# Patient Record
Sex: MALE | Race: WHITE | Employment: FULL TIME | URBAN - METROPOLITAN AREA
[De-identification: names, ages, dates, MRNs, and addresses within clinical notes are randomized per-mention and may not be internally consistent; named-entity substitution may affect disease eponyms.]

---

## 2018-07-30 ENCOUNTER — APPOINTMENT (OUTPATIENT)
Dept: CT IMAGING | Age: 53
End: 2018-07-30
Payer: COMMERCIAL

## 2018-07-30 ENCOUNTER — APPOINTMENT (OUTPATIENT)
Dept: GENERAL RADIOLOGY | Age: 53
End: 2018-07-30
Payer: COMMERCIAL

## 2018-07-30 ENCOUNTER — HOSPITAL ENCOUNTER (EMERGENCY)
Age: 53
Discharge: HOME | End: 2018-07-30
Attending: EMERGENCY MEDICINE
Payer: COMMERCIAL

## 2018-07-30 VITALS
WEIGHT: 254 LBS | HEART RATE: 75 BPM | DIASTOLIC BLOOD PRESSURE: 85 MMHG | SYSTOLIC BLOOD PRESSURE: 146 MMHG | BODY MASS INDEX: 30.93 KG/M2 | OXYGEN SATURATION: 95 % | RESPIRATION RATE: 18 BRPM | HEIGHT: 76 IN

## 2018-07-30 DIAGNOSIS — S06.0X0A CONCUSSION WITHOUT LOSS OF CONSCIOUSNESS, INITIAL ENCOUNTER: Primary | ICD-10-CM

## 2018-07-30 DIAGNOSIS — M54.2 NECK PAIN: ICD-10-CM

## 2018-07-30 DIAGNOSIS — R51.9 INTRACTABLE HEADACHE, UNSPECIFIED CHRONICITY PATTERN, UNSPECIFIED HEADACHE TYPE: ICD-10-CM

## 2018-07-30 DIAGNOSIS — S09.90XA INJURY OF HEAD, INITIAL ENCOUNTER: ICD-10-CM

## 2018-07-30 PROCEDURE — 72125 CT NECK SPINE W/O DYE: CPT

## 2018-07-30 PROCEDURE — 70450 CT HEAD/BRAIN W/O DYE: CPT

## 2018-07-30 PROCEDURE — 99284 EMERGENCY DEPT VISIT MOD MDM: CPT

## 2018-07-30 PROCEDURE — 6370000000 HC RX 637 (ALT 250 FOR IP): Performed by: STUDENT IN AN ORGANIZED HEALTH CARE EDUCATION/TRAINING PROGRAM

## 2018-07-30 RX ORDER — OXYCODONE HYDROCHLORIDE AND ACETAMINOPHEN 5; 325 MG/1; MG/1
1 TABLET ORAL ONCE
Status: COMPLETED | OUTPATIENT
Start: 2018-07-30 | End: 2018-07-30

## 2018-07-30 RX ORDER — OXYCODONE HYDROCHLORIDE AND ACETAMINOPHEN 5; 325 MG/1; MG/1
1 TABLET ORAL EVERY 6 HOURS PRN
Qty: 12 TABLET | Refills: 0 | Status: SHIPPED | OUTPATIENT
Start: 2018-07-30 | End: 2018-08-02

## 2018-07-30 RX ORDER — CYCLOBENZAPRINE HCL 10 MG
10 TABLET ORAL 3 TIMES DAILY PRN
Qty: 10 TABLET | Refills: 0 | Status: SHIPPED | OUTPATIENT
Start: 2018-07-30 | End: 2018-08-09

## 2018-07-30 RX ADMIN — OXYCODONE HYDROCHLORIDE AND ACETAMINOPHEN 1 TABLET: 5; 325 TABLET ORAL at 22:05

## 2018-07-30 ASSESSMENT — ENCOUNTER SYMPTOMS
WHEEZING: 0
EYE REDNESS: 0
VOMITING: 0
SINUS PRESSURE: 0
BACK PAIN: 0
NAUSEA: 0
SHORTNESS OF BREATH: 0
DIARRHEA: 0
SORE THROAT: 0
VISUAL CHANGE: 1
EYE DISCHARGE: 0
BOWEL INCONTINENCE: 0
ABDOMINAL PAIN: 1
COUGH: 0
EYE PAIN: 0

## 2018-07-30 ASSESSMENT — PAIN SCALES - GENERAL
PAINLEVEL_OUTOF10: 7
PAINLEVEL_OUTOF10: 7

## 2018-07-30 ASSESSMENT — PAIN DESCRIPTION - LOCATION: LOCATION: NECK

## 2018-07-30 ASSESSMENT — PAIN DESCRIPTION - PAIN TYPE: TYPE: ACUTE PAIN

## 2018-07-30 ASSESSMENT — PAIN DESCRIPTION - DESCRIPTORS: DESCRIPTORS: THROBBING

## 2018-07-31 NOTE — ED PROVIDER NOTES
allergies. -------------------------------------------------- RESULTS -------------------------------------------------  Labs:  No results found for this visit on 07/30/18. Radiology:  Ct Head Wo Contrast    Result Date: 7/30/2018  EXAM:   CT Head Without Intravenous Contrast EXAM DATE/TIME:   7/30/2018 9:00 PM CLINICAL HISTORY:   46years old, male; Injury or trauma; Fall; Initial encounter; Additional info: Fall, head injury on plavix TECHNIQUE:   Axial computed tomography images of the head/brain without intravenous contrast.  All CT scans at this facility use at least one of these dose optimization techniques: automated exposure control; mA and/or kV adjustment per patient size (includes targeted exams where dose is matched to clinical indication); or iterative reconstruction. COMPARISON:   No relevant prior studies available. FINDINGS:   Brain:  Prominent cisterna magna. Scattered low density areas in the periventricular white matter and basal ganglia probably reflect chronic small vessel ischemic changes. Vascular calcifications are present. There is no intracranial hemorrhage or mass. No abnormal extra-axial or parenchymal fluid collections. Posterior fossa is unremarkable. Ventricles: The ventricles and basilar cisterns are prominant likely related to chronic volume loss. Bones/joints:  See below. Soft tissues:  within normal limits   Sinuses:  Mild mucosal thickening in the ethmoid sinuses present. Mastoid air cells:  Unremarkable as visualized. No mastoid effusion. Nasal cavity/septum:  Minimal fragmentation of the nasal spine of the maxilla, chronicity indeterminate correlate with history. No acute findings. Chronic changes as above.  This report has been electronically signed by Michelle Chu MD.    Ct Cervical Spine Wo Contrast    Result Date: 7/30/2018  EXAM:   CT Cervical Spine Without Intravenous Contrast EXAM DATE/TIME:   7/30/2018 9:00 PM CLINICAL HISTORY:   46years old, male; Injury or trauma; Fall; Initial encounter; Sprain or strain, cervical ligaments; Additional info: Fall from ladder TECHNIQUE:   Axial computed tomography images of the cervical spine without intravenous contrast.  All CT scans at this facility use at least one of these dose optimization techniques: automated exposure control; mA and/or kV adjustment per patient size (includes targeted exams where dose is matched to clinical indication); or iterative reconstruction. COMPARISON:   No relevant prior studies available. FINDINGS:   Vertebrae:  Hypertrophic fragmented osteophytes present anteriorly at C3, C4, C5, C6. No acute fracture or significant subluxation is seen. The occiput, odontoid, and C1 lateral masses are normally visualized. Discs/spinal canal/neural foramina:  Multilevel degenerative changes are present in the cervical spine. Posterior hypertrophic osteophytes and uncovertebral joint hypertrophy contribute to spinal stenosis and foraminal narrowing especially at C5-C6. [place] Soft tissues:  Paraspinal soft tissues are within normal limits. Vascular calcifications are present in the neck   Lung apices:  Unremarkable as visualized. No acute fracture is seen. Degenerative changes. This report has been electronically signed by Maximiliano Villa MD.      ------------------------- NURSING NOTES AND VITALS REVIEWED ---------------------------  Date / Time Roomed:  7/30/2018  9:02 PM  ED Bed Assignment:  12/12    The nursing notes within the ED encounter and vital signs as below have been reviewed. BP (!) 146/85   Pulse 75   Resp 18   Ht 6' 4\" (1.93 m)   Wt 254 lb (115.2 kg)   SpO2 95%   BMI 30.92 kg/m²   Oxygen Saturation Interpretation: Normal        --------------------------------- ADDITIONAL PROVIDER NOTES ---------------------------------  This patient has chosen to leave against medical advice.   I have personally explained to them that choosing to do so may result in permanent bodily harm or death.  I discussed at length that without further evaluation and monitoring there may be unforeseen circumstances and deterioration resulting in permanent bodily harm or death as a result of their choice. They are alert, oriented, and competent at this time. They state that they are aware of the serious risks as explained, but they continue to wish to leave against medical   advice. In light of their decision to leave against medical advice, follow-up has been arranged and they are aware of the importance of following up as instructed. They have been advised that they should return to the ED immediately if they change their mind at any time, or if their condition begins to change or worsen. The follow up plan has been discussed in detail and they are aware of the specific conditions for emergent return, as well as the importance of follow-up. New Prescriptions    No medications on file       Diagnosis:  1. Concussion without loss of consciousness, initial encounter    2. Neck pain        Disposition:  Patient's disposition: Left against medical advice. Patient's condition is stable.              Rita Chambers DO  Resident  07/30/18 [id number]

## 2019-12-30 ENCOUNTER — HOSPITAL ENCOUNTER (OUTPATIENT)
Dept: GENERAL RADIOLOGY | Age: 54
Discharge: HOME | End: 2020-01-01
Payer: COMMERCIAL

## 2019-12-30 ENCOUNTER — HOSPITAL ENCOUNTER (OUTPATIENT)
Age: 54
Discharge: HOME | End: 2020-01-01
Payer: COMMERCIAL

## 2019-12-30 PROCEDURE — 72110 X-RAY EXAM L-2 SPINE 4/>VWS: CPT

## 2024-08-09 ENCOUNTER — HOSPITAL ENCOUNTER (OUTPATIENT)
Age: 59
Discharge: HOME | End: 2024-08-11

## 2024-08-09 PROCEDURE — 88305 TISSUE EXAM BY PATHOLOGIST: CPT

## 2024-08-16 LAB — SURGICAL PATHOLOGY REPORT: NORMAL

## 2025-07-03 ENCOUNTER — HOSPITAL ENCOUNTER (OUTPATIENT)
Dept: GENERAL RADIOLOGY | Age: 60
Discharge: HOME | End: 2025-07-05
Payer: COMMERCIAL

## 2025-07-03 ENCOUNTER — TRANSCRIBE ORDERS (OUTPATIENT)
Dept: GENERAL RADIOLOGY | Age: 60
End: 2025-07-03

## 2025-07-03 DIAGNOSIS — M25.561 RIGHT KNEE PAIN, UNSPECIFIED CHRONICITY: Primary | ICD-10-CM

## 2025-07-03 DIAGNOSIS — M25.561 RIGHT KNEE PAIN, UNSPECIFIED CHRONICITY: ICD-10-CM

## 2025-07-03 PROCEDURE — 73564 X-RAY EXAM KNEE 4 OR MORE: CPT
